# Patient Record
Sex: FEMALE | HISPANIC OR LATINO | Employment: FULL TIME | URBAN - METROPOLITAN AREA
[De-identification: names, ages, dates, MRNs, and addresses within clinical notes are randomized per-mention and may not be internally consistent; named-entity substitution may affect disease eponyms.]

---

## 2023-10-10 ENCOUNTER — LAB (OUTPATIENT)
Dept: LAB | Facility: LAB | Age: 48
End: 2023-10-10
Payer: COMMERCIAL

## 2023-10-10 DIAGNOSIS — N28.1 CYST OF KIDNEY, ACQUIRED: Primary | ICD-10-CM

## 2023-10-10 LAB
ANION GAP SERPL CALC-SCNC: 12 MMOL/L
BUN SERPL-MCNC: 12 MG/DL (ref 8–25)
CALCIUM SERPL-MCNC: 9.4 MG/DL (ref 8.5–10.4)
CHLORIDE SERPL-SCNC: 104 MMOL/L (ref 97–107)
CO2 SERPL-SCNC: 25 MMOL/L (ref 24–31)
CREAT SERPL-MCNC: 0.8 MG/DL (ref 0.4–1.6)
GFR SERPL CREATININE-BSD FRML MDRD: >90 ML/MIN/1.73M*2
GLUCOSE SERPL-MCNC: 100 MG/DL (ref 65–99)
POTASSIUM SERPL-SCNC: 4.5 MMOL/L (ref 3.4–5.1)
SODIUM SERPL-SCNC: 141 MMOL/L (ref 133–145)

## 2023-10-10 PROCEDURE — 36415 COLL VENOUS BLD VENIPUNCTURE: CPT

## 2023-10-10 PROCEDURE — 80048 BASIC METABOLIC PNL TOTAL CA: CPT

## 2023-10-12 ENCOUNTER — ANCILLARY PROCEDURE (OUTPATIENT)
Dept: RADIOLOGY | Facility: CLINIC | Age: 48
End: 2023-10-12
Payer: COMMERCIAL

## 2023-10-12 DIAGNOSIS — N28.1 CYST OF KIDNEY, ACQUIRED: Primary | ICD-10-CM

## 2023-10-12 PROCEDURE — 74160 CT ABDOMEN W/CONTRAST: CPT

## 2023-10-12 PROCEDURE — 2550000001 HC RX 255 CONTRASTS: Performed by: SURGERY

## 2023-10-12 RX ADMIN — IOHEXOL 75 ML: 350 INJECTION, SOLUTION INTRAVENOUS at 11:02

## 2023-11-07 PROBLEM — E66.9 OBESITY: Status: ACTIVE | Noted: 2023-11-07

## 2023-11-07 PROBLEM — I10 ELEVATED BLOOD PRESSURE READING WITH DIAGNOSIS OF HYPERTENSION: Status: ACTIVE | Noted: 2023-05-11

## 2023-11-07 PROBLEM — N63.10 BREAST MASS, RIGHT: Status: ACTIVE | Noted: 2023-05-22

## 2023-11-07 PROBLEM — M53.3 COCCYDYNIA: Status: ACTIVE | Noted: 2023-05-11

## 2023-11-07 PROBLEM — R10.9 FLANK PAIN: Status: ACTIVE | Noted: 2023-07-06

## 2023-11-07 PROBLEM — N28.1 RENAL CYSTS, ACQUIRED, BILATERAL: Status: ACTIVE | Noted: 2023-05-20

## 2023-11-08 ENCOUNTER — OFFICE VISIT (OUTPATIENT)
Dept: PRIMARY CARE | Facility: CLINIC | Age: 48
End: 2023-11-08
Payer: COMMERCIAL

## 2023-11-08 VITALS
WEIGHT: 196 LBS | HEIGHT: 62 IN | TEMPERATURE: 99 F | HEART RATE: 111 BPM | SYSTOLIC BLOOD PRESSURE: 138 MMHG | DIASTOLIC BLOOD PRESSURE: 80 MMHG | BODY MASS INDEX: 36.07 KG/M2 | OXYGEN SATURATION: 97 %

## 2023-11-08 DIAGNOSIS — L02.429 FURUNCLE OF AXILLA, UNSPECIFIED LATERALITY: Primary | ICD-10-CM

## 2023-11-08 DIAGNOSIS — L81.1 MELASMA: ICD-10-CM

## 2023-11-08 DIAGNOSIS — G89.29 CHRONIC JOINT PAIN: ICD-10-CM

## 2023-11-08 DIAGNOSIS — M25.50 CHRONIC JOINT PAIN: ICD-10-CM

## 2023-11-08 PROCEDURE — 99213 OFFICE O/P EST LOW 20 MIN: CPT | Performed by: FAMILY MEDICINE

## 2023-11-08 PROCEDURE — 1036F TOBACCO NON-USER: CPT | Performed by: FAMILY MEDICINE

## 2023-11-08 PROCEDURE — 3075F SYST BP GE 130 - 139MM HG: CPT | Performed by: FAMILY MEDICINE

## 2023-11-08 PROCEDURE — 3079F DIAST BP 80-89 MM HG: CPT | Performed by: FAMILY MEDICINE

## 2023-11-08 ASSESSMENT — PATIENT HEALTH QUESTIONNAIRE - PHQ9
1. LITTLE INTEREST OR PLEASURE IN DOING THINGS: NOT AT ALL
SUM OF ALL RESPONSES TO PHQ9 QUESTIONS 1 AND 2: 0
2. FEELING DOWN, DEPRESSED OR HOPELESS: NOT AT ALL

## 2023-11-08 ASSESSMENT — ENCOUNTER SYMPTOMS: BACK PAIN: 1

## 2023-11-08 ASSESSMENT — PAIN SCALES - GENERAL: PAINLEVEL: 3

## 2023-11-08 NOTE — PROGRESS NOTES
"Subjective   Patient ID: Martha Hopper is a 47 y.o. female who presents for Adenopathy (In bilat. Axilla x 2 months /Patient reports area became painful and swollen in the past two weeks ) and Back Pain (X 1 week, no known injury ).    1) Axillary masses: normally has intermittent masses that arise with menses and resolve after menses, in the past 2 months the masses have not resolved, treated with warm compresses and ibuprofen, now masses are small and barely palpable, masses do not drain, masses are painful.    2) Chronic ankle joint edema and pain: PCP in [place] checked for autoimmune disease 6 years ago, sed rate and CRP were elevated, patient was referred to rheumatologist but then came to the [place] and did not follow through with referral.    3) Melasma: chronic, began after the birth of her daughter 12 years ago, worsening in the past few years, would like treatment.      Review of Systems   Constitutional: Negative.    Respiratory: Negative.     Cardiovascular: Negative.    Musculoskeletal:  Positive for arthralgias, back pain and joint swelling.   Skin:  Positive for rash.        Positive for axillary masses.     Objective   /80 (BP Location: Left arm)   Pulse (!) 111   Temp 37.2 °C (99 °F) (Temporal)   Ht 1.575 m (5' 2\")   Wt 88.9 kg (196 lb)   LMP 11/02/2023 (Exact Date)   SpO2 97%   BMI 35.85 kg/m²     Physical Exam  Vitals reviewed.   Constitutional:       Appearance: Normal appearance. She is obese.   Cardiovascular:      Rate and Rhythm: Normal rate and regular rhythm.   Pulmonary:      Effort: Pulmonary effort is normal.      Breath sounds: Normal breath sounds.   Musculoskeletal:      Comments: Ankles: minimal swelling without erythema or induration.   Skin:     Comments: Axillae: hyperpigmented lesions, R>L, minimal induration in the left axilla, no erythema or pustules seen.    Face: hyperpigmentation of the cheeks.     Neurological:      Mental Status: She is alert. "     Assessment/Plan   Problem List Items Addressed This Visit    None  Visit Diagnoses         Codes    Furuncle of axilla, unspecified laterality    -  Primary L02.429    Relevant Orders    CBC and Auto Differential    Chronic joint pain     M25.50, G89.29    Relevant Orders    C-Reactive Protein    Sedimentation Rate    DOMINICK with Reflex to ENOC    CBC and Auto Differential    Melasma     L81.1    Relevant Orders    Referral to Dermatology        Axillary boils chronic and recurrent.    Current infections resolving.  Recommended washing with antibacterial soap.  Warm compresses and NSAIDs when boils present.  Chronic joint pain unresolved.  Labs ordered.  Will notify with results.    Melasma chronic.  Referred to derm.  Await input.  Follow up as directed.

## 2023-11-15 ASSESSMENT — ENCOUNTER SYMPTOMS
RESPIRATORY NEGATIVE: 1
JOINT SWELLING: 1
ARTHRALGIAS: 1
CONSTITUTIONAL NEGATIVE: 1
CARDIOVASCULAR NEGATIVE: 1

## 2023-11-16 ENCOUNTER — LAB (OUTPATIENT)
Dept: LAB | Facility: LAB | Age: 48
End: 2023-11-16
Payer: COMMERCIAL

## 2023-11-16 DIAGNOSIS — M25.50 CHRONIC JOINT PAIN: ICD-10-CM

## 2023-11-16 DIAGNOSIS — G89.29 CHRONIC JOINT PAIN: ICD-10-CM

## 2023-11-16 DIAGNOSIS — L02.429 FURUNCLE OF AXILLA, UNSPECIFIED LATERALITY: ICD-10-CM

## 2023-11-16 LAB
BASOPHILS # BLD AUTO: 0.05 X10*3/UL (ref 0–0.1)
BASOPHILS NFR BLD AUTO: 0.4 %
CRP SERPL-MCNC: 2.1 MG/DL (ref 0–2)
EOSINOPHIL # BLD AUTO: 0.03 X10*3/UL (ref 0–0.7)
EOSINOPHIL NFR BLD AUTO: 0.3 %
ERYTHROCYTE [DISTWIDTH] IN BLOOD BY AUTOMATED COUNT: 13.3 % (ref 11.5–14.5)
ERYTHROCYTE [SEDIMENTATION RATE] IN BLOOD BY WESTERGREN METHOD: 64 MM/H (ref 0–20)
HCT VFR BLD AUTO: 42.3 % (ref 36–46)
HGB BLD-MCNC: 12.9 G/DL (ref 12–16)
IMM GRANULOCYTES # BLD AUTO: 0.04 X10*3/UL (ref 0–0.7)
IMM GRANULOCYTES NFR BLD AUTO: 0.3 % (ref 0–0.9)
LYMPHOCYTES # BLD AUTO: 2.79 X10*3/UL (ref 1.2–4.8)
LYMPHOCYTES NFR BLD AUTO: 23.4 %
MCH RBC QN AUTO: 28.5 PG (ref 26–34)
MCHC RBC AUTO-ENTMCNC: 30.5 G/DL (ref 32–36)
MCV RBC AUTO: 94 FL (ref 80–100)
MONOCYTES # BLD AUTO: 0.61 X10*3/UL (ref 0.1–1)
MONOCYTES NFR BLD AUTO: 5.1 %
NEUTROPHILS # BLD AUTO: 8.39 X10*3/UL (ref 1.2–7.7)
NEUTROPHILS NFR BLD AUTO: 70.5 %
NRBC BLD-RTO: 0 /100 WBCS (ref 0–0)
PLATELET # BLD AUTO: 387 X10*3/UL (ref 150–450)
RBC # BLD AUTO: 4.52 X10*6/UL (ref 4–5.2)
WBC # BLD AUTO: 11.9 X10*3/UL (ref 4.4–11.3)

## 2023-11-16 PROCEDURE — 86235 NUCLEAR ANTIGEN ANTIBODY: CPT

## 2023-11-16 PROCEDURE — 85652 RBC SED RATE AUTOMATED: CPT

## 2023-11-16 PROCEDURE — 36415 COLL VENOUS BLD VENIPUNCTURE: CPT

## 2023-11-16 PROCEDURE — 86225 DNA ANTIBODY NATIVE: CPT

## 2023-11-16 PROCEDURE — 86038 ANTINUCLEAR ANTIBODIES: CPT

## 2023-11-16 PROCEDURE — 86140 C-REACTIVE PROTEIN: CPT

## 2023-11-16 PROCEDURE — 85025 COMPLETE CBC W/AUTO DIFF WBC: CPT

## 2023-11-17 LAB
ANA PATTERN: ABNORMAL
ANA SER QL HEP2 SUBST: POSITIVE
ANA TITR SER IF: ABNORMAL {TITER}
CENTROMERE B AB SER-ACNC: <0.2 AI
CHROMATIN AB SERPL-ACNC: <0.2 AI
DSDNA AB SER-ACNC: 1 IU/ML
ENA JO1 AB SER QL IA: <0.2 AI
ENA RNP AB SER IA-ACNC: <0.2 AI
ENA SCL70 AB SER QL IA: <0.2 AI
ENA SM AB SER IA-ACNC: <0.2 AI
ENA SM+RNP AB SER QL IA: <0.2 AI
ENA SS-A AB SER IA-ACNC: <0.2 AI
ENA SS-B AB SER IA-ACNC: <0.2 AI
RIBOSOMAL P AB SER-ACNC: <0.2 AI

## 2023-11-27 ENCOUNTER — TELEPHONE (OUTPATIENT)
Dept: PRIMARY CARE | Facility: CLINIC | Age: 48
End: 2023-11-27
Payer: COMMERCIAL

## 2023-11-27 DIAGNOSIS — M25.50 CHRONIC JOINT PAIN: ICD-10-CM

## 2023-11-27 DIAGNOSIS — R76.8 POSITIVE ANA (ANTINUCLEAR ANTIBODY): ICD-10-CM

## 2023-11-27 DIAGNOSIS — G89.29 CHRONIC JOINT PAIN: ICD-10-CM

## 2023-11-27 NOTE — TELEPHONE ENCOUNTER
----- Message from Sivan Salvador DO sent at 11/26/2023 11:19 PM EST -----  Please inform the patient that her labs are positive for a possible autoimmune disease.  I would like her to see a rheumatologist for further evaluation.  Please refer her to Dr. Lolly Guevara for chronic joint pain and positive DOMINICK.  Thank you.

## 2024-01-15 ENCOUNTER — OFFICE VISIT (OUTPATIENT)
Dept: RHEUMATOLOGY | Facility: CLINIC | Age: 49
End: 2024-01-15
Payer: COMMERCIAL

## 2024-01-15 VITALS — BODY MASS INDEX: 35.48 KG/M2 | DIASTOLIC BLOOD PRESSURE: 68 MMHG | SYSTOLIC BLOOD PRESSURE: 118 MMHG | WEIGHT: 194 LBS

## 2024-01-15 DIAGNOSIS — R76.8 ANA POSITIVE: ICD-10-CM

## 2024-01-15 DIAGNOSIS — R70.0 ELEVATED ERYTHROCYTE SEDIMENTATION RATE: ICD-10-CM

## 2024-01-15 DIAGNOSIS — M19.90 ARTHRITIS: Primary | ICD-10-CM

## 2024-01-15 DIAGNOSIS — R79.82 ELEVATED C-REACTIVE PROTEIN: ICD-10-CM

## 2024-01-15 PROCEDURE — 1036F TOBACCO NON-USER: CPT | Performed by: INTERNAL MEDICINE

## 2024-01-15 PROCEDURE — 99214 OFFICE O/P EST MOD 30 MIN: CPT | Performed by: INTERNAL MEDICINE

## 2024-01-15 PROCEDURE — 3074F SYST BP LT 130 MM HG: CPT | Performed by: INTERNAL MEDICINE

## 2024-01-15 PROCEDURE — 99204 OFFICE O/P NEW MOD 45 MIN: CPT | Performed by: INTERNAL MEDICINE

## 2024-01-15 PROCEDURE — 3078F DIAST BP <80 MM HG: CPT | Performed by: INTERNAL MEDICINE

## 2024-01-15 RX ORDER — NABUMETONE 750 MG/1
750 TABLET, FILM COATED ORAL 2 TIMES DAILY PRN
Qty: 60 TABLET | Refills: 2 | Status: SHIPPED | OUTPATIENT
Start: 2024-01-15 | End: 2024-09-11

## 2024-01-15 NOTE — PATIENT INSTRUCTIONS
You may have a type of arthritis called sjogren sydrome that causes dry mouth.  You can try over the counter mouth moisturizers for this.  You can take nabumetone 1 tab twice a day as needed for pain.  It's ok to take tylenol/acetaminophen with this but no other over the counter painkillers - no advil/ibuprofen or aleve/naproxen sodium or any medications that contain these  Follow up with your orthopedist about the trigger finger  Call with any questions/concerns

## 2024-01-15 NOTE — PROGRESS NOTES
"Ref per Dr. Salvador for evaluation and treatment of abnormal labs.  Per patient PCP in [place] tested in 6 years ago with positive results.   C/O sternal pain / upper back scapular pain / bilat hand off and on x years.  C/O bilat ankle swelling off and on. PCP eval with thoughts that maybe due to cystic kidney.    Left hip pain with lying down.  H/O left middle trigger finger.  Cortisone injection approx Sept with Ortho with short term relief x 2 months.    HPI - Pt is here with a +DOMINICK, checked because pt stated that she had had incr ESR and CRP in [place] in 2016.  She doesn't know why this was checked and says that she didn't have any symptoms, and her dr couldn't understand why she didn't have sx since her ESR and CRP were incr.  More recently, she had L 3rd trigger finger, and she can't fully flex it.  She had an injection from ortho  in sept or oct helped for 2 mo.  She doesn't have any other pain currently.  She gets ankle swelling, and she had this 6 yrs ago.  She has cysts in her kidneys - she was nephrol who told her that she has a lot of simple cysts and told her to follow up in 1 yr.  She has had R-sided upper back pain.  She gets coccygeal pain with sitting and walking.  She sometimes gets L hip area pain with walking.  She gets sternal pain.  She takes advil or tylenol, which helps.  She takes approx 2x/wk.  She didn't understand when I mean when I asked about numbness.  Google translate didn't help.  Intermittent AM stiffness in her legs.  No fever, HA, mouth sores, raynauds, rash (although per chart gets \"boils\" in axilla)  +CP - she thinks it's sternal pain.  No resp.  No GI.  +dry mouth    FH - ?arthritis.  No CTD  SH - nonsmoker.  No EtOH.  No drugs    PE  Gen - NAD  HEENT -   Neck - supple, no LAD  CV - RRR no r/m/g  Lungs - CTA  Abd - +BS  Extr - 2+ DP, PT, and rad pulses.  No c/c/e  Skin - no rash.  No boils in axillae  Psych - nl affect  Neuro - nl strength.  Reflexes 2+ symmetric  Msk - " no synovitis.  B knee crepitus.  Tender palmar aspect of L 3rd finger, no triggering elicited    A/P - Pt here with h/o chronically incr ESR and CRP, L trigger finger, and back pain.  Reviewed 2023 labs - DOMINICK 1:320/panel neg; CBC mild leukocytosis x1, nl x1; CMP - alk phos 151; HgA1c 5.7  Reviewed labs from 2016 - 2017 - ESR 75/77/87/79; CRP (Mg/dL) 1.56/2.45; CK nl;  RF and DOMINICK/dsDNA  neg.  RPR neg; HLA B-27 neg; TSH nl; C3/C4 nl; uric acid 4.5  The significance of the chronically incr ESR and CRP is unclear.  It could potentially be d/t body habitus.  It's unlikely to be d/t malignancy or infection d/t chronicity.  The significance of the +DOMINICK is likewise unclear.  She does not have evidence of inflammatory arthritis or CTD at this time other than sicca sx.  SS is in ddx.  She may have OA  She had trigger finger that responded to injection, but effects didn't last long - recommend going back to ortho  Nabumetone - expl risks/benefits/no OTC NSAIDS  Re-eval 3 mo

## 2024-01-24 ENCOUNTER — PATIENT MESSAGE (OUTPATIENT)
Dept: PRIMARY CARE | Facility: CLINIC | Age: 49
End: 2024-01-24
Payer: COMMERCIAL

## 2024-01-24 DIAGNOSIS — L65.9 HAIR LOSS: Primary | ICD-10-CM

## 2024-01-25 ENCOUNTER — LAB (OUTPATIENT)
Dept: LAB | Facility: LAB | Age: 49
End: 2024-01-25
Payer: COMMERCIAL

## 2024-01-25 DIAGNOSIS — L65.9 HAIR LOSS: ICD-10-CM

## 2024-01-25 LAB — TSH SERPL DL<=0.05 MIU/L-ACNC: 1.18 MIU/L (ref 0.27–4.2)

## 2024-01-25 PROCEDURE — 36415 COLL VENOUS BLD VENIPUNCTURE: CPT

## 2024-01-25 PROCEDURE — 84443 ASSAY THYROID STIM HORMONE: CPT

## 2024-01-30 ENCOUNTER — OFFICE VISIT (OUTPATIENT)
Dept: PRIMARY CARE | Facility: CLINIC | Age: 49
End: 2024-01-30
Payer: COMMERCIAL

## 2024-01-30 ENCOUNTER — HOSPITAL ENCOUNTER (OUTPATIENT)
Dept: RADIOLOGY | Facility: CLINIC | Age: 49
Discharge: HOME | End: 2024-01-30
Payer: COMMERCIAL

## 2024-01-30 VITALS
OXYGEN SATURATION: 97 % | SYSTOLIC BLOOD PRESSURE: 142 MMHG | BODY MASS INDEX: 36.07 KG/M2 | WEIGHT: 196 LBS | HEIGHT: 62 IN | DIASTOLIC BLOOD PRESSURE: 90 MMHG | TEMPERATURE: 98 F | HEART RATE: 118 BPM

## 2024-01-30 DIAGNOSIS — M79.604 RIGHT LEG PAIN: ICD-10-CM

## 2024-01-30 DIAGNOSIS — R07.9 CHEST PAIN, UNSPECIFIED TYPE: Primary | ICD-10-CM

## 2024-01-30 DIAGNOSIS — R07.9 CHEST PAIN, UNSPECIFIED TYPE: ICD-10-CM

## 2024-01-30 PROCEDURE — 73562 X-RAY EXAM OF KNEE 3: CPT | Mod: RT

## 2024-01-30 PROCEDURE — 3077F SYST BP >= 140 MM HG: CPT | Performed by: FAMILY MEDICINE

## 2024-01-30 PROCEDURE — 71046 X-RAY EXAM CHEST 2 VIEWS: CPT

## 2024-01-30 PROCEDURE — 99213 OFFICE O/P EST LOW 20 MIN: CPT | Performed by: FAMILY MEDICINE

## 2024-01-30 PROCEDURE — 3080F DIAST BP >= 90 MM HG: CPT | Performed by: FAMILY MEDICINE

## 2024-01-30 PROCEDURE — 93000 ELECTROCARDIOGRAM COMPLETE: CPT | Performed by: FAMILY MEDICINE

## 2024-01-30 PROCEDURE — 1036F TOBACCO NON-USER: CPT | Performed by: FAMILY MEDICINE

## 2024-01-30 ASSESSMENT — PATIENT HEALTH QUESTIONNAIRE - PHQ9
2. FEELING DOWN, DEPRESSED OR HOPELESS: NOT AT ALL
1. LITTLE INTEREST OR PLEASURE IN DOING THINGS: NOT AT ALL
SUM OF ALL RESPONSES TO PHQ9 QUESTIONS 1 AND 2: 0

## 2024-01-30 ASSESSMENT — ENCOUNTER SYMPTOMS: ARTHRALGIAS: 1

## 2024-01-30 ASSESSMENT — PAIN SCALES - GENERAL: PAINLEVEL: 0-NO PAIN

## 2024-01-30 NOTE — PROGRESS NOTES
"Subjective   Patient ID: Martha Vivar is a 48 y.o. female who presents for Leg Pain (Right/X 1 week/Intermittent/No known injury ) and Chest Pain (Intermittent /Chest pressure/X 1 month /).    1) Chest pain: began several months ago, intermittent, located at the sternum, takes Advil or Tylenol which helps, takes pain medication about 2 times a week, discussed with rheumatologist at recent visit, nabumetone prescribed, last night had 3 episodes of chest pain back to back which worried the patient, the episodes occurred in less than an hour, did not have any other symptoms with the chest pain.    2) Right knee pain: began 1 week ago, intermittent, denies injury, pain begins behind the knee and radiates to the side of the knee and down the side of the leg about half way down the leg, denies skin changes, takes ibuprofen which temporarily helps, pain occurs when sitting or laying down, pain lasts for a few minutes, changing leg position resolves the pain.    Review of Systems   Cardiovascular:  Positive for chest pain.   Musculoskeletal:  Positive for arthralgias.     Objective   /90 (BP Location: Left arm)   Pulse (!) 118   Temp 36.7 °C (98 °F) (Temporal)   Ht 1.575 m (5' 2\")   Wt 88.9 kg (196 lb)   SpO2 97%   BMI 35.85 kg/m²     Physical Exam  Vitals reviewed.   Constitutional:       Appearance: Normal appearance. She is obese.   Cardiovascular:      Rate and Rhythm: Normal rate and regular rhythm.   Pulmonary:      Effort: Pulmonary effort is normal.      Breath sounds: Normal breath sounds.   Musculoskeletal:         General: No tenderness (Unable to reproduce chest and right knee pain.).   Neurological:      Mental Status: She is alert.   Psychiatric:         Behavior: Behavior normal.      Comments: Anxious mood     Assessment/Plan   Diagnoses and all orders for this visit:  Chest pain, unspecified type  Chronic.  Worsened last night.  ECG 12 lead (Clinic Performed)   XR chest 2 views ordered.  Will " notify with results.  Referral to Cardiology if CXR is normal.    Continue with nabumetone.    Follow up as directed.    Right leg pain  New.  XR knee right 3 views ordered.  Will notify with results.  May refer to ortho or PT.  Continue with nabumetone.    Follow up as directed.

## 2024-01-31 PROBLEM — M17.11 LOCALIZED OSTEOARTHRITIS OF RIGHT KNEE: Status: ACTIVE | Noted: 2024-01-31

## 2024-02-01 DIAGNOSIS — M79.604 RIGHT LEG PAIN: ICD-10-CM

## 2024-02-08 ENCOUNTER — HOSPITAL ENCOUNTER (OUTPATIENT)
Dept: VASCULAR MEDICINE | Facility: CLINIC | Age: 49
Discharge: HOME | End: 2024-02-08
Payer: COMMERCIAL

## 2024-02-08 DIAGNOSIS — M79.604 RIGHT LEG PAIN: ICD-10-CM

## 2024-02-08 PROCEDURE — 93971 EXTREMITY STUDY: CPT | Performed by: SURGERY

## 2024-02-08 PROCEDURE — 93971 EXTREMITY STUDY: CPT

## 2024-02-12 ENCOUNTER — PATIENT MESSAGE (OUTPATIENT)
Dept: PRIMARY CARE | Facility: CLINIC | Age: 49
End: 2024-02-12
Payer: COMMERCIAL

## 2024-02-12 DIAGNOSIS — M79.604 RIGHT LEG PAIN: ICD-10-CM

## 2024-02-12 DIAGNOSIS — M17.11 LOCALIZED OSTEOARTHRITIS OF RIGHT KNEE: Primary | ICD-10-CM

## 2024-03-20 ENCOUNTER — HOSPITAL ENCOUNTER (OUTPATIENT)
Dept: CARDIOLOGY | Facility: HOSPITAL | Age: 49
Discharge: HOME | End: 2024-03-20
Payer: COMMERCIAL

## 2024-03-20 ENCOUNTER — HOSPITAL ENCOUNTER (OUTPATIENT)
Dept: RADIOLOGY | Facility: HOSPITAL | Age: 49
Discharge: HOME | End: 2024-03-20
Payer: COMMERCIAL

## 2024-03-20 DIAGNOSIS — R06.02 SHORTNESS OF BREATH: ICD-10-CM

## 2024-03-20 DIAGNOSIS — R09.89 OTHER SPECIFIED SYMPTOMS AND SIGNS INVOLVING THE CIRCULATORY AND RESPIRATORY SYSTEMS: ICD-10-CM

## 2024-03-20 LAB
AORTIC VALVE MEAN GRADIENT: 6 MMHG
AORTIC VALVE PEAK VELOCITY: 1.84 M/S
AV PEAK GRADIENT: 13.5 MMHG
AVA (PEAK VEL): 1.86 CM2
AVA (VTI): 2.24 CM2
EJECTION FRACTION APICAL 4 CHAMBER: 65
EJECTION FRACTION: 62 %
LEFT ATRIUM VOLUME AREA LENGTH INDEX BSA: 16.4 ML/M2
LEFT VENTRICLE INTERNAL DIMENSION DIASTOLE: 4.31 CM (ref 3.5–6)
LEFT VENTRICULAR OUTFLOW TRACT DIAMETER: 2 CM
MITRAL VALVE E/A RATIO: 0.78
MITRAL VALVE E/E' RATIO: 6.77
RIGHT VENTRICLE FREE WALL PEAK S': 9.57 CM/S
TRICUSPID ANNULAR PLANE SYSTOLIC EXCURSION: 2 CM

## 2024-03-20 PROCEDURE — 93306 TTE W/DOPPLER COMPLETE: CPT

## 2024-03-20 PROCEDURE — 93880 EXTRACRANIAL BILAT STUDY: CPT

## 2024-04-15 ENCOUNTER — APPOINTMENT (OUTPATIENT)
Dept: RHEUMATOLOGY | Facility: CLINIC | Age: 49
End: 2024-04-15
Payer: COMMERCIAL

## 2024-05-07 ENCOUNTER — HOSPITAL ENCOUNTER (OUTPATIENT)
Dept: RADIOLOGY | Facility: HOSPITAL | Age: 49
Discharge: HOME | End: 2024-05-07
Payer: COMMERCIAL

## 2024-05-07 DIAGNOSIS — R07.89 OTHER CHEST PAIN: ICD-10-CM

## 2024-05-07 PROCEDURE — 75571 CT HRT W/O DYE W/CA TEST: CPT
